# Patient Record
Sex: MALE | Race: WHITE | NOT HISPANIC OR LATINO | Employment: FULL TIME | RURAL
[De-identification: names, ages, dates, MRNs, and addresses within clinical notes are randomized per-mention and may not be internally consistent; named-entity substitution may affect disease eponyms.]

---

## 2021-09-17 NOTE — PROGRESS NOTES
Subjective   Silverio Angeles is a 56 y.o. male.     Chief Complaint   Patient presents with   • Establish Care   • Blood Sugar Problem   • Nicotine Dependence   • Cyst   • Foot Injury       Silverio reports that he was seen at [facility] due to foot pain and infection approx 5 weeks ago. He was administered a IV antibiotic and sent home on cephalexin. He then followed up with Dr Reyes (podiatry) and was prescribed amoxicillin in addition to the cephalexin. He reports that he finished course of antibiotics and is scheduled for a followup in 2 weeks with Dr Reyes.     He also reports that hospital was concerned of elevated blood sugar reading of over 200 at time of visit     Blood Sugar Problem  This is a new problem. The problem occurs constantly. Associated symptoms include numbness (in feet, swelling in calves and ankle with redness on bottom of feet ), a rash and urinary symptoms. Pertinent negatives include no abdominal pain, anorexia, arthralgias, change in bowel habit, chest pain, chills, congestion, coughing, diaphoresis, fatigue, fever, headaches, joint swelling, myalgias, nausea, neck pain, sore throat, swollen glands, vertigo, visual change, vomiting or weakness.   Nicotine Dependence  Presents for initial visit. Symptoms include cravings and irritability. Symptoms are negative for fatigue, headache and sore throat. Preferred tobacco types include cigarettes. Preferred cigarette types include filtered. Preferred strength is light. Preferred cigarettes are non-menthol. Preferred brands include [place]. His urge triggers include company of smokers, drinking coffee and driving. Risk factors do not include contact with substance, decrease in perceived risk, disruptive behavior, drinking alcohol, meal time, perceived media message about smoking or stress.His first smoke is before 6 AM. He smokes 3 packs of cigarettes per day. He started smoking when he was 15-17 years old. Past treatments include buproprion. The  treatment provided significant relief. Compliance with prior treatments has been good. Silverio is ready to quit. Silverio has tried to quit 1 time. There is no history of alcohol abuse and drug use.   Cyst  This is a new problem. The current episode started more than 1 year ago. The problem occurs constantly. The problem has been unchanged. Associated symptoms include numbness (in feet, swelling in calves and ankle with redness on bottom of feet ), a rash and urinary symptoms. Pertinent negatives include no abdominal pain, anorexia, arthralgias, change in bowel habit, chest pain, chills, congestion, coughing, diaphoresis, fatigue, fever, headaches, joint swelling, myalgias, nausea, neck pain, sore throat, swollen glands, vertigo, visual change, vomiting or weakness. Associated symptoms comments: Itching, oozing. Nothing aggravates the symptoms. He has tried nothing for the symptoms.   Foot Injury   The incident occurred more than 1 week ago. There was no injury mechanism. The pain is present in the left foot. The quality of the pain is described as aching. The pain is at a severity of 2/10. The pain is mild. The pain has been fluctuating since onset. Associated symptoms include numbness (in feet, swelling in calves and ankle with redness on bottom of feet ) and tingling. Pertinent negatives include no inability to bear weight, loss of motion, loss of sensation or muscle weakness. He reports no foreign bodies present. Nothing aggravates the symptoms. He has tried acetaminophen and NSAIDs (antibiotics,) for the symptoms. The treatment provided significant relief.            I personally reviewed and updated the patient's allergies, medications, problem list, and past medical, surgical, social, and family history. I have reviewed and confirmed the accuracy of the History of Present Illness and Review of Symptoms as documented by the MA/LPN/RN. Cuco Garcia MD    Family History   Problem Relation Age of Onset   • Cancer  Mother    • Diabetes Father    • Diabetes Sister    • Cancer Sister        Social History     Tobacco Use   • Smoking status: Current Every Day Smoker     Packs/day: 4.00     Years: 40.00     Pack years: 160.00     Types: Cigarettes     Start date: 1981   • Smokeless tobacco: Never Used   Vaping Use   • Vaping Use: Never used   Substance Use Topics   • Alcohol use: Yes     Comment: rarely   • Drug use: Not Currently       Past Surgical History:   Procedure Laterality Date   • HAND SURGERY Left 1981       Patient Active Problem List   Diagnosis   • Foot injury, left, sequela   • Elevated blood sugar   • Encounter for tobacco use cessation counseling   • Current tobacco use   • Skin lesion   • Class 2 severe obesity due to excess calories with serious comorbidity and body mass index (BMI) of 36.0 to 36.9 in adult (CMS/[facility])         Current Outpatient Medications:   •  buPROPion XL (WELLBUTRIN XL) 150 MG 24 hr tablet, Take 1 tablet by mouth Daily., Disp: 30 tablet, Rfl: 2  •  lisinopril (PRINIVIL,ZESTRIL) 20 MG tablet, Take 1 tablet by mouth Daily., Disp: 30 tablet, Rfl: 2  •  metFORMIN (GLUCOPHAGE) 500 MG tablet, Take 1 tablet by mouth 2 (Two) Times a Day With Meals., Disp: 60 tablet, Rfl: 2         Review of Systems   Constitutional: Positive for irritability. Negative for chills, diaphoresis, fatigue and fever.   HENT: Negative for congestion, sore throat and swollen glands.    Eyes: Negative for visual disturbance.   Respiratory: Negative for cough and shortness of breath.    Cardiovascular: Negative for chest pain and palpitations.   Gastrointestinal: Negative for abdominal pain, anorexia, change in bowel habit, nausea and vomiting.   Endocrine: Negative for polydipsia and polyphagia.   Musculoskeletal: Negative for arthralgias, joint swelling, myalgias, neck pain and neck stiffness.   Skin: Positive for rash. Negative for color change and pallor.   Neurological: Positive for tingling and numbness (in feet,  "swelling in calves and ankle with redness on bottom of feet ). Negative for vertigo, seizures, syncope and weakness.   Hematological: Negative for adenopathy.   Psychiatric/Behavioral: Negative for hallucinations, suicidal ideas and stress.       I have reviewed and confirmed the accuracy of the ROS as documented by the MA/LPN/RN Cuco Garcia MD      Objective   BP (!) 162/102   Pulse 104   Temp 96.8 °F (36 °C)   Resp 18   Ht 190.5 cm (75\")   Wt 133 kg (293 lb)   SpO2 98%   BMI 36.62 kg/m²   BP Readings from Last 3 Encounters:   09/20/21 (!) 162/102     Wt Readings from Last 3 Encounters:   09/20/21 133 kg (293 lb)     Physical Exam  Constitutional:       Appearance: Normal appearance. He is well-developed. He is not diaphoretic.   Eyes:      General: Lids are normal.      Extraocular Movements:      Right eye: No nystagmus.      Left eye: No nystagmus.      Conjunctiva/sclera: Conjunctivae normal.      Right eye: Right conjunctiva is not injected. No hemorrhage.     Left eye: Left conjunctiva is not injected. No hemorrhage.     Pupils: Pupils are equal, round, and reactive to light.      Funduscopic exam:     Right eye: No hemorrhage, exudate, AV nicking, arteriolar narrowing or papilledema.         Left eye: No hemorrhage, exudate, AV nicking, arteriolar narrowing or papilledema.   Cardiovascular:      Rate and Rhythm: Normal rate and regular rhythm.      Pulses: Normal pulses.      Heart sounds: Normal heart sounds, S1 normal and S2 normal. No murmur heard.   No friction rub. No gallop.    Pulmonary:      Effort: Pulmonary effort is normal. No accessory muscle usage.      Breath sounds: Normal breath sounds. No stridor. No decreased breath sounds, wheezing, rhonchi or rales.   Abdominal:      General: Bowel sounds are normal. There is no distension.      Palpations: Abdomen is soft. Abdomen is not rigid. There is no mass or pulsatile mass.      Tenderness: There is no abdominal tenderness. There is no " guarding or rebound. Negative signs include Youngblood's sign.      Hernia: No hernia is present.   Feet:      Right foot:      Protective Sensation: 10 sites tested. 10 sites sensed.      Skin integrity: Dry skin present.      Toenail Condition: Right toenails are normal.      Left foot:      Protective Sensation: 10 sites tested. 10 sites sensed.      Skin integrity: Ulcer, skin breakdown, callus and dry skin present.      Toenail Condition: Left toenails are normal.   Skin:     General: Skin is warm and dry.      Coloration: Skin is not pale.   Neurological:      Mental Status: He is alert and oriented to person, place, and time.      Coordination: Coordination normal.      Gait: Gait normal.         Data / Lab Results:    Hemoglobin A1C   Date Value Ref Range Status   09/20/2021 12.8 % Final        No results found for: LDL, LDLDIRECT  No results found for: CHOL  No results found for: TRIG  No results found for: HDL  No results found for: PSA  No results found for: WBC, HGB, HCT, MCV, PLT  No results found for: TSH, E9XXLFA, A8VXZMH   No results found for: GLUCOSE, BUN, CREATININE, EGFRIFNONA, EGFRIFAFRI, BCR, K, CO2, CALCIUM, PROTENTOTREF, ALBUMIN, LABIL2, BILIRUBIN, AST, ALT  No results found for: ADRIANA, RF, SEDRATE   No results found for: CRP   No results found for: IRON, TIBC, FERRITIN   No results found for: [id number]       Assessment/Plan      Medications        Problem List         LOS    Cellulitis foot.  Clinically improved today, completing course antibiotics, followed by podiatry Dr. Reyes.  Type 2 diabetes.  New onset A1c 12.5.  Start Metformin.  Monitor blood sugars.  Follow-up 1 month.  Discussed diet, exercise, lifestyle modification.  Coated baby aspirin daily.  Hypertension.  Longstanding.  Start lisinopril.  Follow-up recheck.  Tobacco use.  Smokes 4 packs/day since the age of 20.  Encourage cessation.  Has done well on Wellbutrin, restart.  Follow-up recheck.  New patient today.  Follow-up visit  for comprehensive physical exam and screening tests scheduled.        Diagnoses and all orders for this visit:    1. Encounter to establish care (Primary)    2. New onset type 2 diabetes mellitus (CMS/[facility])  -     POC Glycosylated Hemoglobin (Hb A1C)  -     Discontinue: metFORMIN (GLUCOPHAGE) 500 MG tablet; Take 1 tablet by mouth 2 (Two) Times a Day With Meals.  Dispense: 60 tablet; Refill: 2  -     metFORMIN (GLUCOPHAGE) 500 MG tablet; Take 1 tablet by mouth 2 (Two) Times a Day With Meals.  Dispense: 60 tablet; Refill: 2    3. Type 2 diabetes mellitus with hyperglycemia, without long-term current use of insulin (CMS/[facility])  -     POC Glycosylated Hemoglobin (Hb A1C)  -     Cancel: Comprehensive Metabolic Panel  -     Discontinue: metFORMIN (GLUCOPHAGE) 500 MG tablet; Take 1 tablet by mouth 2 (Two) Times a Day With Meals.  Dispense: 60 tablet; Refill: 2  -     Comprehensive Metabolic Panel  -     metFORMIN (GLUCOPHAGE) 500 MG tablet; Take 1 tablet by mouth 2 (Two) Times a Day With Meals.  Dispense: 60 tablet; Refill: 2    4. Foot injury, left, sequela    5. Elevated blood sugar  -     POC Glycosylated Hemoglobin (Hb A1C)    6. Encounter for tobacco use cessation counseling    7. Current tobacco use  -     Discontinue: buPROPion XL (WELLBUTRIN XL) 150 MG 24 hr tablet; Take 1 tablet by mouth Daily.  Dispense: 30 tablet; Refill: 2  -     buPROPion XL (WELLBUTRIN XL) 150 MG 24 hr tablet; Take 1 tablet by mouth Daily.  Dispense: 30 tablet; Refill: 2    8. Skin lesion    9. Class 2 severe obesity due to excess calories with serious comorbidity and body mass index (BMI) of 36.0 to 36.9 in adult (CMS/[facility])  -     Discontinue: buPROPion XL (WELLBUTRIN XL) 150 MG 24 hr tablet; Take 1 tablet by mouth Daily.  Dispense: 30 tablet; Refill: 2  -     buPROPion XL (WELLBUTRIN XL) 150 MG 24 hr tablet; Take 1 tablet by mouth Daily.  Dispense: 30 tablet; Refill: 2    10. Screening for hyperlipidemia  -     Cancel: Lipid Panel With / Chol /  HDL Ratio  -     Lipid Panel With / Chol / HDL Ratio    11. Hypertension, essential  -     Cancel: CBC & Differential  -     Cancel: Comprehensive Metabolic Panel  -     Cancel: TSH  -     CBC & Differential  -     Comprehensive Metabolic Panel  -     TSH  -     lisinopril (PRINIVIL,ZESTRIL) 20 MG tablet; Take 1 tablet by mouth Daily.  Dispense: 30 tablet; Refill: 2    12. Screening for malignant neoplasm of prostate  -     Cancel: PSA Screen  -     PSA Screen    13. Acute reaction to stress  -     Discontinue: buPROPion XL (WELLBUTRIN XL) 150 MG 24 hr tablet; Take 1 tablet by mouth Daily.  Dispense: 30 tablet; Refill: 2  -     buPROPion XL (WELLBUTRIN XL) 150 MG 24 hr tablet; Take 1 tablet by mouth Daily.  Dispense: 30 tablet; Refill: 2    Other orders  -     Discontinue: lisinopril (PRINIVIL,ZESTRIL) 20 MG tablet; Take 1 tablet by mouth Daily.  Dispense: 30 tablet; Refill: 2              Expected course, medications, and adverse effects discussed.  Call or return if worsening or persistent symptoms.  I wore protective equipment throughout this patient encounter including a mask, gloves, and eye protection.  Hand hygiene was performed before donning protective equipment and after removal when leaving the room. The complete contents of the Assessment and Plan and Data/Lab Results as documented above have been reviewed and addressed by myself with the patient today as part of an ongoing evaluation / treatment plan.  If some of the documentation has been copied from a previous note and is unchanged it indicates that this problem / plan has been assessed today but is stable from a previous visit and no changes have been recommended.

## 2021-09-20 ENCOUNTER — OFFICE VISIT (OUTPATIENT)
Dept: FAMILY MEDICINE CLINIC | Facility: CLINIC | Age: 56
End: 2021-09-20

## 2021-09-20 VITALS
TEMPERATURE: 96.8 F | OXYGEN SATURATION: 98 % | HEIGHT: 75 IN | HEART RATE: 104 BPM | DIASTOLIC BLOOD PRESSURE: 102 MMHG | SYSTOLIC BLOOD PRESSURE: 162 MMHG | RESPIRATION RATE: 18 BRPM | WEIGHT: 293 LBS | BODY MASS INDEX: 36.43 KG/M2

## 2021-09-20 DIAGNOSIS — F43.0 ACUTE REACTION TO STRESS: ICD-10-CM

## 2021-09-20 DIAGNOSIS — I10 HYPERTENSION, ESSENTIAL: ICD-10-CM

## 2021-09-20 DIAGNOSIS — Z72.0 CURRENT TOBACCO USE: ICD-10-CM

## 2021-09-20 DIAGNOSIS — S99.922S FOOT INJURY, LEFT, SEQUELA: ICD-10-CM

## 2021-09-20 DIAGNOSIS — E11.65 TYPE 2 DIABETES MELLITUS WITH HYPERGLYCEMIA, WITHOUT LONG-TERM CURRENT USE OF INSULIN (HCC): ICD-10-CM

## 2021-09-20 DIAGNOSIS — R73.9 ELEVATED BLOOD SUGAR: ICD-10-CM

## 2021-09-20 DIAGNOSIS — Z76.89 ENCOUNTER TO ESTABLISH CARE: Primary | ICD-10-CM

## 2021-09-20 DIAGNOSIS — E66.01 CLASS 2 SEVERE OBESITY DUE TO EXCESS CALORIES WITH SERIOUS COMORBIDITY AND BODY MASS INDEX (BMI) OF 36.0 TO 36.9 IN ADULT (HCC): ICD-10-CM

## 2021-09-20 DIAGNOSIS — Z13.220 SCREENING FOR HYPERLIPIDEMIA: ICD-10-CM

## 2021-09-20 DIAGNOSIS — E11.9 NEW ONSET TYPE 2 DIABETES MELLITUS (HCC): ICD-10-CM

## 2021-09-20 DIAGNOSIS — L98.9 SKIN LESION: ICD-10-CM

## 2021-09-20 DIAGNOSIS — Z12.5 SCREENING FOR MALIGNANT NEOPLASM OF PROSTATE: ICD-10-CM

## 2021-09-20 DIAGNOSIS — Z71.6 ENCOUNTER FOR TOBACCO USE CESSATION COUNSELING: ICD-10-CM

## 2021-09-20 LAB — HBA1C MFR BLD: 12.8 %

## 2021-09-20 PROCEDURE — 99204 OFFICE O/P NEW MOD 45 MIN: CPT | Performed by: FAMILY MEDICINE

## 2021-09-20 RX ORDER — BUPROPION HYDROCHLORIDE 150 MG/1
150 TABLET ORAL DAILY
Qty: 30 TABLET | Refills: 2 | Status: SHIPPED | OUTPATIENT
Start: 2021-09-20 | End: 2021-09-20

## 2021-09-20 RX ORDER — LISINOPRIL 20 MG/1
20 TABLET ORAL DAILY
Qty: 30 TABLET | Refills: 2 | Status: SHIPPED | OUTPATIENT
Start: 2021-09-20 | End: 2021-09-20

## 2021-09-20 RX ORDER — BUPROPION HYDROCHLORIDE 150 MG/1
150 TABLET ORAL DAILY
Qty: 30 TABLET | Refills: 2 | Status: SHIPPED | OUTPATIENT
Start: 2021-09-20 | End: 2021-12-16 | Stop reason: SDUPTHER

## 2021-09-20 RX ORDER — LISINOPRIL 20 MG/1
20 TABLET ORAL DAILY
Qty: 30 TABLET | Refills: 2 | Status: SHIPPED | OUTPATIENT
Start: 2021-09-20 | End: 2021-12-16 | Stop reason: SDUPTHER

## 2021-09-21 ENCOUNTER — TELEPHONE (OUTPATIENT)
Dept: FAMILY MEDICINE CLINIC | Facility: CLINIC | Age: 56
End: 2021-09-21

## 2021-09-21 LAB
ALBUMIN SERPL-MCNC: 4.5 G/DL (ref 3.8–4.9)
ALBUMIN/GLOB SERPL: 1.7 {RATIO} (ref 1.2–2.2)
ALP SERPL-CCNC: 95 IU/L (ref 44–121)
ALT SERPL-CCNC: 24 IU/L (ref 0–44)
AST SERPL-CCNC: 16 IU/L (ref 0–40)
BASOPHILS # BLD AUTO: 0 X10E3/UL (ref 0–0.2)
BASOPHILS NFR BLD AUTO: 1 %
BILIRUB SERPL-MCNC: 0.8 MG/DL (ref 0–1.2)
BUN SERPL-MCNC: 13 MG/DL (ref 6–24)
BUN/CREAT SERPL: 17 (ref 9–20)
CALCIUM SERPL-MCNC: 9.7 MG/DL (ref 8.7–10.2)
CHLORIDE SERPL-SCNC: 94 MMOL/L (ref 96–106)
CHOLEST SERPL-MCNC: 124 MG/DL (ref 100–199)
CHOLEST/HDLC SERPL: 2.9 RATIO (ref 0–5)
CO2 SERPL-SCNC: 24 MMOL/L (ref 20–29)
CREAT SERPL-MCNC: 0.75 MG/DL (ref 0.76–1.27)
EOSINOPHIL # BLD AUTO: 0 X10E3/UL (ref 0–0.4)
EOSINOPHIL NFR BLD AUTO: 0 %
ERYTHROCYTE [DISTWIDTH] IN BLOOD BY AUTOMATED COUNT: 13 % (ref 11.6–15.4)
GLOBULIN SER CALC-MCNC: 2.7 G/DL (ref 1.5–4.5)
GLUCOSE SERPL-MCNC: ABNORMAL MG/DL
HCT VFR BLD AUTO: 50.9 % (ref 37.5–51)
HDLC SERPL-MCNC: 43 MG/DL
HGB BLD-MCNC: 17.2 G/DL (ref 13–17.7)
IMM GRANULOCYTES # BLD AUTO: 0 X10E3/UL (ref 0–0.1)
IMM GRANULOCYTES NFR BLD AUTO: 0 %
LDLC SERPL CALC-MCNC: 59 MG/DL (ref 0–99)
LYMPHOCYTES # BLD AUTO: 2.6 X10E3/UL (ref 0.7–3.1)
LYMPHOCYTES NFR BLD AUTO: 30 %
MCH RBC QN AUTO: 31.4 PG (ref 26.6–33)
MCHC RBC AUTO-ENTMCNC: 33.8 G/DL (ref 31.5–35.7)
MCV RBC AUTO: 93 FL (ref 79–97)
MONOCYTES # BLD AUTO: 0.5 X10E3/UL (ref 0.1–0.9)
MONOCYTES NFR BLD AUTO: 6 %
NEUTROPHILS # BLD AUTO: 5.5 X10E3/UL (ref 1.4–7)
NEUTROPHILS NFR BLD AUTO: 63 %
PLATELET # BLD AUTO: 323 X10E3/UL (ref 150–450)
POTASSIUM SERPL-SCNC: ABNORMAL MMOL/L
PROT SERPL-MCNC: 7.2 G/DL (ref 6–8.5)
PSA SERPL-MCNC: 2 NG/ML (ref 0–4)
RBC # BLD AUTO: 5.48 X10E6/UL (ref 4.14–5.8)
SODIUM SERPL-SCNC: 135 MMOL/L (ref 134–144)
TRIGL SERPL-MCNC: 124 MG/DL (ref 0–149)
TSH SERPL DL<=0.005 MIU/L-ACNC: 1.38 UIU/ML (ref 0.45–4.5)
VLDLC SERPL CALC-MCNC: 22 MG/DL (ref 5–40)
WBC # BLD AUTO: 8.7 X10E3/UL (ref 3.4–10.8)

## 2021-09-21 NOTE — TELEPHONE ENCOUNTER
PLEASE CANCEL THE PRESCRIPTIONS THAT WERE SENT TO [facility] IN [place], SO PATIENT CAN HAVE THEM FILLED AT [facility] IN [place], [place].  PLEASE CONTACT PATIENT ONCE THIS HAS BEEN TAKEN CARE -[phone].  metFORMIN (GLUCOPHAGE) 500 MG tablet  lisinopril (PRINIVIL,ZESTRIL) 20 MG tablet  buPROPion XL (WELLBUTRIN XL) 150 MG 24 hr tablet    PATIENT ALSO NEEDS TO RESCHEDULE HIS PHYSICAL APPOINTMENT AND DR. CONWAY WANTED TO SEE HIM WITH IN A MONTH FROM 9/20 FOR MEDICATION FOLLOW UP.  PATIENT IS WORKING OUT OF TOWN AND NEEDS [facility] FIRST THING OR [facility] LAST APPOINTMENT.  PLEASE HELP RESCHEDULE.

## 2021-09-21 NOTE — TELEPHONE ENCOUNTER
Spoke to [facility]. He said they closed early yesterday and looks like corporate took care of this. All meds are deleted from [facility]. Called and let him know.

## 2021-10-15 NOTE — PROGRESS NOTES
Subjective   Silverio Angeles is a 56 y.o. male.     Chief Complaint   Patient presents with   • Annual Exam   • Diabetes   • Hypertension       The patient is here: to discuss health maintenance and disease prevention to follow up on multiple medical problems.  Last comprehensive physical was on unknown.  Overall has: moderate activity with work/home activities, exercises 2 - 3 times per week, good appetite and is sleeping poorly. Nutrition: eating a variety of foods. Last tetanus shot was >10 years ago. Patient's last PSA was: 2.0 on 9/20/2021    Diabetes  He presents for his follow-up diabetic visit. He has type 2 diabetes mellitus. Pertinent negatives for hypoglycemia include no pallor or seizures. Pertinent negatives for diabetes include no chest pain, no polydipsia and no polyphagia. There are no hypoglycemic complications. Risk factors for coronary artery disease include male sex, obesity and hypertension. Current diabetic treatment includes oral agent (monotherapy). His breakfast blood glucose range is generally >200 mg/dl. His dinner blood glucose range is generally 140-180 mg/dl. His overall blood glucose range is 180-200 mg/dl. (Glucose was 270 at today's visit. Patient has only had black coffee today  ) He does not see a podiatrist.Eye exam is not current.   Hypertension  This is a chronic problem. The current episode started more than 1 year ago. Pertinent negatives include no chest pain, palpitations or shortness of breath. Risk factors for coronary artery disease include obesity, male gender, diabetes mellitus and smoking/tobacco exposure. Current antihypertension treatment includes ACE inhibitors.   Abdominal Pain  This is a new problem. Episode onset: 4 days ago. The problem occurs constantly. The pain is at a severity of 4/10. The pain is mild. Quality: Air  Pertinent negatives include no nausea. Relieved by: Laying down.        Recent Hospitalizations:  No hospitalization(s) within the last  year..  ccc      I personally reviewed and updated the patient's allergies, medications, problem list, and past medical, surgical, social, and family history. I have reviewed and confirmed the accuracy of the HPI and ROS as documented by the MA/LPN/RN Cuco Garcia MD    Family History   Problem Relation Age of Onset   • Cancer Mother    • Diabetes Father    • Diabetes Sister    • Cancer Sister        Social History     Tobacco Use   • Smoking status: Current Every Day Smoker     Packs/day: 4.00     Years: 40.00     Pack years: 160.00     Types: Cigarettes     Start date: 1981   • Smokeless tobacco: Never Used   Vaping Use   • Vaping Use: Never used   Substance Use Topics   • Alcohol use: Yes     Alcohol/week: 0.0 standard drinks     Comment: rarely   • Drug use: Not Currently       Past Surgical History:   Procedure Laterality Date   • HAND SURGERY Left 1981       Patient Active Problem List   Diagnosis   • Foot injury, left, sequela   • Encounter for tobacco use cessation counseling   • Current tobacco use   • Skin lesion   • Annual visit for general adult medical examination with abnormal findings   • Type 2 diabetes mellitus with hyperglycemia, without long-term current use of insulin (HCC)   • Elevated blood pressure reading   • Class 2 severe obesity due to excess calories with serious comorbidity and body mass index (BMI) of 35.0 to 35.9 in adult (HCC)   • Essential hypertension         Current Outpatient Medications:   •  buPROPion XL (WELLBUTRIN XL) 150 MG 24 hr tablet, Take 1 tablet by mouth Daily., Disp: 30 tablet, Rfl: 2  •  lisinopril (PRINIVIL,ZESTRIL) 20 MG tablet, Take 1 tablet by mouth Daily., Disp: 30 tablet, Rfl: 2  •  metFORMIN (GLUCOPHAGE) 500 MG tablet, Take 1 tablet by mouth 2 (Two) Times a Day With Meals., Disp: 60 tablet, Rfl: 2  •  glipizide (glipiZIDE XL) 5 MG ER tablet, Take 1 tablet by mouth Daily., Disp: 30 tablet, Rfl: 5  •  Glucose Blood (Blood Glucose Test) strip, 1 strip by In  "Vitro route 3 (Three) Times a Day., Disp: 90 each, Rfl: 5    Review of Systems   Constitutional: Negative for chills and diaphoresis.   Eyes: Negative for visual disturbance.   Respiratory: Negative for shortness of breath.    Cardiovascular: Negative for chest pain and palpitations.   Gastrointestinal: Negative for abdominal pain and nausea.   Endocrine: Negative for polydipsia and polyphagia.   Musculoskeletal: Negative for neck stiffness.   Skin: Negative for color change and pallor.   Neurological: Negative for seizures and syncope.   Hematological: Negative for adenopathy.       I have reviewed and confirmed the accuracy of the ROS as documented by the MA/LPN/RN Cuco Garcia MD      Objective   /84   Pulse 109   Temp 98 °F (36.7 °C)   Resp 18   Ht 190.5 cm (75\")   Wt 129 kg (283 lb 12.8 oz)   SpO2 99%   BMI 35.47 kg/m²   BP Readings from Last 3 Encounters:   10/27/21 132/84   09/20/21 (!) 162/102     Wt Readings from Last 3 Encounters:   10/27/21 129 kg (283 lb 12.8 oz)   09/20/21 133 kg (293 lb)     Physical Exam  Constitutional:       Appearance: Normal appearance. He is well-developed. He is not diaphoretic.   Eyes:      General: Lids are normal.      Extraocular Movements:      Right eye: No nystagmus.      Left eye: No nystagmus.      Conjunctiva/sclera: Conjunctivae normal.      Right eye: Right conjunctiva is not injected. No hemorrhage.     Left eye: Left conjunctiva is not injected. No hemorrhage.     Pupils: Pupils are equal, round, and reactive to light.      Funduscopic exam:     Right eye: No hemorrhage, exudate, AV nicking, arteriolar narrowing or papilledema.         Left eye: No hemorrhage, exudate, AV nicking, arteriolar narrowing or papilledema.   Cardiovascular:      Rate and Rhythm: Normal rate and regular rhythm.      Pulses: Normal pulses.      Heart sounds: Normal heart sounds, S1 normal and S2 normal. No murmur heard.  No friction rub. No gallop.    Pulmonary:      Effort: " Pulmonary effort is normal. No accessory muscle usage.      Breath sounds: Normal breath sounds. No stridor. No decreased breath sounds, wheezing, rhonchi or rales.   Abdominal:      General: Bowel sounds are normal. There is no distension.      Palpations: Abdomen is soft. Abdomen is not rigid. There is no mass or pulsatile mass.      Tenderness: There is no abdominal tenderness. There is no guarding or rebound. Negative signs include Youngblood's sign.      Hernia: No hernia is present.   Skin:     General: Skin is warm and dry.      Coloration: Skin is not pale.   Neurological:      Mental Status: He is alert and oriented to person, place, and time.      Coordination: Coordination normal.      Gait: Gait normal.         Data / Lab Results:    Hemoglobin A1C   Date Value Ref Range Status   10/27/2021 10.0 % Final   09/20/2021 12.8 % Final     Lab Results   Component Value Date    GLU CANCELED 09/20/2021     Lab Results   Component Value Date    LDL 59 09/20/2021     No results found for: CHOL  Lab Results   Component Value Date    TRIG 124 09/20/2021     Lab Results   Component Value Date    HDL 43 09/20/2021     Lab Results   Component Value Date    PSA 2.0 09/20/2021     Lab Results   Component Value Date    WBC 8.7 09/20/2021    HGB 17.2 09/20/2021    HCT 50.9 09/20/2021    MCV 93 09/20/2021     09/20/2021     Lab Results   Component Value Date    TSH 1.380 09/20/2021      Lab Results   Component Value Date    BUN 13 09/20/2021    CREATININE 0.75 (L) 09/20/2021    EGFRIFNONA 104 09/20/2021    EGFRIFAFRI 120 09/20/2021    BCR 17 09/20/2021    K CANCELED 09/20/2021    CO2 24 09/20/2021    CALCIUM 9.7 09/20/2021    PROTENTOTREF 7.2 09/20/2021    ALBUMIN 4.5 09/20/2021    LABIL2 1.7 09/20/2021    AST 16 09/20/2021    ALT 24 09/20/2021     No results found for: ADRIANA, RF, SEDRATE   No results found for: CRP   No results found for: IRON, TIBC, FERRITIN   No results found for: [id number]     Age-appropriate Screening  Schedule:  Refer to the list below for future screening recommendations based on patient's age, sex and/or medical conditions. Orders for these recommended tests are listed in the plan section. The patient has been provided with a written plan.    Health Maintenance   Topic Date Due   • TDAP/TD VACCINES (1 - Tdap) Never done   • ZOSTER VACCINE (1 of 2) Never done   • INFLUENZA VACCINE  Never done   • DIABETIC FOOT EXAM  Never done   • DIABETIC EYE EXAM  Never done   • HEMOGLOBIN A1C  04/27/2022   • URINE MICROALBUMIN  10/27/2022           Assessment/Plan      Medications        Problem List         LOS    Physical.  Doing well, vaccines current.  Discussed coated baby aspirin daily.  Discussed health maintenance, screening test, lifestyle modification.  EKG benign today.  Cellulitis foot.  Clinically improved today, completing course antibiotics, followed by podiatry Dr. Reyes.  Type 2 diabetes.  New onset, improved today A1c to 10.    Tolerating Metformin 500 mg twice daily, add glipizide XL..    Home blood sugars levels reviewed, improving.  Follow-up 3 month.  Discussed diet, exercise, lifestyle modification.  Coated baby aspirin daily.  Treadmill echocardiogram, carotid Dopplers planned when blood sugar is better controlled.  Hypertension.  Longstanding.    Improved today on lisinopril.  Follow-up recheck.  Tobacco use.    Improved today, has cut back to 2 packs/day.  Smokes 4 packs/day since the age of 20.  Encourage cessation.  Has done well on Wellbutrin, restart.  Follow-up recheck.  Plan low-dose CT scan lung cancer screening next year.  Colon screening.  Recommend colonoscopy/Cologuard he declines.  Prostate cancer screening.  PSA benign.        Diagnoses and all orders for this visit:    1. Annual visit for general adult medical examination with abnormal findings (Primary)    2. Type 2 diabetes mellitus with hyperglycemia, without long-term current use of insulin (HCC)  -     POC Glycosylated Hemoglobin  (Hb A1C)  -     POCT Glucose  -     POCT urinalysis dipstick, manual  -     POCT microalbumin  -     Glucose Blood (Blood Glucose Test) strip; 1 strip by In Vitro route 3 (Three) Times a Day.  Dispense: 90 each; Refill: 5  -     glipizide (glipiZIDE XL) 5 MG ER tablet; Take 1 tablet by mouth Daily.  Dispense: 30 tablet; Refill: 5    3. Elevated blood pressure reading  -     ECG 12 Lead    4. Current tobacco use    5. Class 2 severe obesity due to excess calories with serious comorbidity and body mass index (BMI) of 35.0 to 35.9 in adult (HCC)    6. Chest pain, unspecified type  -     ECG 12 Lead    7. Essential hypertension              Expected course, medications, and adverse effects discussed.  Call or return if worsening or persistent symptoms.  I wore protective equipment throughout this patient encounter including a mask, gloves, and eye protection.  Hand hygiene was performed before donning protective equipment and after removal when leaving the room. The complete contents of the Assessment and Plan and Data / Lab Results as documented above have been reviewed and addressed by myself with the patient today as part of an ongoing evaluation / treatment plan.  If some of the documentation has been copied from a previous note and is unchanged it indicates that this problem / plan has been assessed today but is stable from a previous visit and no changes have been recommended.

## 2021-10-27 ENCOUNTER — OFFICE VISIT (OUTPATIENT)
Dept: FAMILY MEDICINE CLINIC | Facility: CLINIC | Age: 56
End: 2021-10-27

## 2021-10-27 VITALS
DIASTOLIC BLOOD PRESSURE: 84 MMHG | WEIGHT: 283.8 LBS | HEART RATE: 109 BPM | SYSTOLIC BLOOD PRESSURE: 132 MMHG | TEMPERATURE: 98 F | OXYGEN SATURATION: 99 % | BODY MASS INDEX: 35.29 KG/M2 | RESPIRATION RATE: 18 BRPM | HEIGHT: 75 IN

## 2021-10-27 DIAGNOSIS — Z72.0 CURRENT TOBACCO USE: ICD-10-CM

## 2021-10-27 DIAGNOSIS — I10 ESSENTIAL HYPERTENSION: ICD-10-CM

## 2021-10-27 DIAGNOSIS — R07.9 CHEST PAIN, UNSPECIFIED TYPE: ICD-10-CM

## 2021-10-27 DIAGNOSIS — E11.65 TYPE 2 DIABETES MELLITUS WITH HYPERGLYCEMIA, WITHOUT LONG-TERM CURRENT USE OF INSULIN (HCC): ICD-10-CM

## 2021-10-27 DIAGNOSIS — E66.01 CLASS 2 SEVERE OBESITY DUE TO EXCESS CALORIES WITH SERIOUS COMORBIDITY AND BODY MASS INDEX (BMI) OF 35.0 TO 35.9 IN ADULT (HCC): ICD-10-CM

## 2021-10-27 DIAGNOSIS — R03.0 ELEVATED BLOOD PRESSURE READING: ICD-10-CM

## 2021-10-27 DIAGNOSIS — Z00.01 ANNUAL VISIT FOR GENERAL ADULT MEDICAL EXAMINATION WITH ABNORMAL FINDINGS: Primary | ICD-10-CM

## 2021-10-27 PROBLEM — R73.9 ELEVATED BLOOD SUGAR: Status: RESOLVED | Noted: 2021-09-20 | Resolved: 2021-10-27

## 2021-10-27 LAB
BILIRUB BLD-MCNC: NEGATIVE MG/DL
CLARITY, POC: CLEAR
COLOR UR: ABNORMAL
EXPIRATION DATE: NORMAL
GLUCOSE BLDC GLUCOMTR-MCNC: 265 MG/DL (ref 70–130)
GLUCOSE UR STRIP-MCNC: NEGATIVE MG/DL
HBA1C MFR BLD: 10 %
KETONES UR QL: NEGATIVE
LEUKOCYTE EST, POC: NEGATIVE
Lab: NORMAL
NITRITE UR-MCNC: NEGATIVE MG/ML
PH UR: 5 [PH] (ref 5–8)
POC MICROALBUMIN URINE: 0.5
PROT UR STRIP-MCNC: ABNORMAL MG/DL
RBC # UR STRIP: NEGATIVE /UL
SP GR UR: 1 (ref 1–1.03)
UROBILINOGEN UR QL: NORMAL

## 2021-10-27 PROCEDURE — 81002 URINALYSIS NONAUTO W/O SCOPE: CPT | Performed by: FAMILY MEDICINE

## 2021-10-27 PROCEDURE — 83036 HEMOGLOBIN GLYCOSYLATED A1C: CPT | Performed by: FAMILY MEDICINE

## 2021-10-27 PROCEDURE — 99396 PREV VISIT EST AGE 40-64: CPT | Performed by: FAMILY MEDICINE

## 2021-10-27 PROCEDURE — 99214 OFFICE O/P EST MOD 30 MIN: CPT | Performed by: FAMILY MEDICINE

## 2021-10-27 PROCEDURE — 93000 ELECTROCARDIOGRAM COMPLETE: CPT | Performed by: FAMILY MEDICINE

## 2021-10-27 PROCEDURE — 82044 UR ALBUMIN SEMIQUANTITATIVE: CPT | Performed by: FAMILY MEDICINE

## 2021-10-27 PROCEDURE — 82962 GLUCOSE BLOOD TEST: CPT | Performed by: FAMILY MEDICINE

## 2021-10-27 RX ORDER — GLIPIZIDE 5 MG/1
5 TABLET, FILM COATED, EXTENDED RELEASE ORAL DAILY
Qty: 30 TABLET | Refills: 5 | Status: SHIPPED | OUTPATIENT
Start: 2021-10-27 | End: 2022-04-15

## 2021-10-27 RX ORDER — GLUCOSAMINE HCL/CHONDROITIN SU 500-400 MG
1 CAPSULE ORAL 3 TIMES DAILY
Qty: 90 EACH | Refills: 5 | Status: SHIPPED | OUTPATIENT
Start: 2021-10-27 | End: 2022-10-10

## 2021-12-16 DIAGNOSIS — Z72.0 CURRENT TOBACCO USE: ICD-10-CM

## 2021-12-16 DIAGNOSIS — E11.9 NEW ONSET TYPE 2 DIABETES MELLITUS (HCC): ICD-10-CM

## 2021-12-16 DIAGNOSIS — F43.0 ACUTE REACTION TO STRESS: ICD-10-CM

## 2021-12-16 DIAGNOSIS — E11.65 TYPE 2 DIABETES MELLITUS WITH HYPERGLYCEMIA, WITHOUT LONG-TERM CURRENT USE OF INSULIN (HCC): ICD-10-CM

## 2021-12-16 DIAGNOSIS — E66.01 CLASS 2 SEVERE OBESITY DUE TO EXCESS CALORIES WITH SERIOUS COMORBIDITY AND BODY MASS INDEX (BMI) OF 36.0 TO 36.9 IN ADULT (HCC): ICD-10-CM

## 2021-12-16 DIAGNOSIS — I10 HYPERTENSION, ESSENTIAL: ICD-10-CM

## 2021-12-17 RX ORDER — LISINOPRIL 20 MG/1
20 TABLET ORAL DAILY
Qty: 30 TABLET | Refills: 2 | Status: SHIPPED | OUTPATIENT
Start: 2021-12-17 | End: 2022-03-20 | Stop reason: SDUPTHER

## 2021-12-17 RX ORDER — BUPROPION HYDROCHLORIDE 150 MG/1
150 TABLET ORAL DAILY
Qty: 30 TABLET | Refills: 2 | Status: SHIPPED | OUTPATIENT
Start: 2021-12-17

## 2022-01-28 NOTE — PROGRESS NOTES
Subjective   Silverio Angeles is a 56 y.o. male.     Chief Complaint   Patient presents with   • Diabetes   • Hypertension       Diabetic Foot Exam Performed and Monofilament Test Performed    Diabetes  He presents for his follow-up diabetic visit. He has type 2 diabetes mellitus. Pertinent negatives for hypoglycemia include no pallor or seizures. Pertinent negatives for diabetes include no chest pain, no polydipsia and no polyphagia. There are no hypoglycemic complications. Risk factors for coronary artery disease include male sex, obesity and hypertension. Current diabetic treatment includes oral agent (monotherapy). His breakfast blood glucose range is generally >200 mg/dl. His dinner blood glucose range is generally 140-180 mg/dl. His overall blood glucose range is 180-200 mg/dl. (Glucose was 160 this morning prior to today's visit.  ) He does not see a podiatrist.Eye exam is not current.   Hypertension  This is a chronic problem. The current episode started more than 1 year ago. Pertinent negatives include no chest pain, palpitations or shortness of breath. Risk factors for coronary artery disease include obesity, male gender, diabetes mellitus and smoking/tobacco exposure. Current antihypertension treatment includes ACE inhibitors.            I personally reviewed and updated the patient's allergies, medications, problem list, and past medical, surgical, social, and family history. I have reviewed and confirmed the accuracy of the History of Present Illness and Review of Symptoms as documented by the MA/MANOLON/RN. Cuco Garcia MD    Family History   Problem Relation Age of Onset   • Cancer Mother    • Diabetes Father    • Diabetes Sister    • Cancer Sister        Social History     Tobacco Use   • Smoking status: Current Every Day Smoker     Packs/day: 4.00     Years: 40.00     Pack years: 160.00     Types: Cigarettes     Start date: 1981   • Smokeless tobacco: Never Used   Vaping Use   • Vaping Use: Never used    Substance Use Topics   • Alcohol use: Yes     Alcohol/week: 0.0 standard drinks     Comment: rarely   • Drug use: Not Currently       Past Surgical History:   Procedure Laterality Date   • HAND SURGERY Left 1981       Patient Active Problem List   Diagnosis   • Foot injury, left, sequela   • Encounter for tobacco use cessation counseling   • Current tobacco use   • Skin lesion   • Annual visit for general adult medical examination with abnormal findings   • Type 2 diabetes mellitus with hyperglycemia, without long-term current use of insulin ([facility])   • Class 2 severe obesity due to excess calories with serious comorbidity and body mass index (BMI) of 35.0 to 35.9 in adult ([facility])   • Essential hypertension   • Gastroesophageal reflux disease without esophagitis         Current Outpatient Medications:   •  Baxdela 450 MG tablet, Take 450 mg by mouth 2 (Two) Times a Day., Disp: , Rfl:   •  buPROPion XL (WELLBUTRIN XL) 150 MG 24 hr tablet, Take 1 tablet by mouth Daily., Disp: 30 tablet, Rfl: 2  •  glipizide (glipiZIDE XL) 5 MG ER tablet, Take 1 tablet by mouth Daily., Disp: 30 tablet, Rfl: 5  •  Glucose Blood (Blood Glucose Test) strip, 1 strip by In Vitro route 3 (Three) Times a Day., Disp: 90 each, Rfl: 5  •  lisinopril (PRINIVIL,ZESTRIL) 20 MG tablet, Take 1 tablet by mouth Daily., Disp: 30 tablet, Rfl: 2  •  metFORMIN (GLUCOPHAGE) 500 MG tablet, Take 1 tablet by mouth 2 (Two) Times a Day With Meals., Disp: 60 tablet, Rfl: 2          Review of Systems   Constitutional: Negative for chills and diaphoresis.   Eyes: Negative for visual disturbance.   Respiratory: Negative for shortness of breath.    Cardiovascular: Negative for chest pain and palpitations.   Gastrointestinal: Negative for abdominal pain and nausea.   Endocrine: Negative for polydipsia and polyphagia.   Musculoskeletal: Negative for neck stiffness.   Skin: Negative for color change and pallor.   Neurological: Negative for seizures and syncope.  "  Hematological: Negative for adenopathy.   Psychiatric/Behavioral: Negative for hallucinations and suicidal ideas.       I have reviewed and confirmed the accuracy of the ROS as documented by the MA/LPN/RN Cuco Garcia MD      Objective   /90   Pulse 90   Temp 97.1 °F (36.2 °C)   Resp 18   Ht 190.5 cm (75\")   Wt 129 kg (284 lb)   SpO2 99%   BMI 35.50 kg/m²   BP Readings from Last 3 Encounters:   01/31/22 150/90   10/27/21 132/84   09/20/21 (!) 162/102     Wt Readings from Last 3 Encounters:   01/31/22 129 kg (284 lb)   10/27/21 129 kg (283 lb 12.8 oz)   09/20/21 133 kg (293 lb)           Data / Lab Results:    Hemoglobin A1C   Date Value Ref Range Status   01/31/2022 7.6 % Final   10/27/2021 10.0 % Final   09/20/2021 12.8 % Final        Lab Results   Component Value Date    LDL 59 09/20/2021     No results found for: CHOL  Lab Results   Component Value Date    TRIG 124 09/20/2021     Lab Results   Component Value Date    HDL 43 09/20/2021     Lab Results   Component Value Date    PSA 2.0 09/20/2021     Lab Results   Component Value Date    WBC 8.7 09/20/2021    HGB 17.2 09/20/2021    HCT 50.9 09/20/2021    MCV 93 09/20/2021     09/20/2021     Lab Results   Component Value Date    TSH 1.380 09/20/2021      Lab Results   Component Value Date    GLUCOSE CANCELED 09/20/2021    BUN 13 09/20/2021    CREATININE 0.75 (L) 09/20/2021    EGFRIFNONA 104 09/20/2021    EGFRIFAFRI 120 09/20/2021    BCR 17 09/20/2021    K CANCELED 09/20/2021    CO2 24 09/20/2021    CALCIUM 9.7 09/20/2021    PROTENTOTREF 7.2 09/20/2021    ALBUMIN 4.5 09/20/2021    LABIL2 1.7 09/20/2021    AST 16 09/20/2021    ALT 24 09/20/2021     No results found for: ADRIANA, RF, SEDRATE   No results found for: CRP   No results found for: IRON, TIBC, FERRITIN   No results found for: [id number]         Physical Exam  Constitutional:       Appearance: Normal appearance. He is well-developed. He is not diaphoretic.   Eyes:      General: Lids are " normal.      Extraocular Movements:      Right eye: No nystagmus.      Left eye: No nystagmus.      Conjunctiva/sclera: Conjunctivae normal.      Right eye: Right conjunctiva is not injected. No hemorrhage.     Left eye: Left conjunctiva is not injected. No hemorrhage.     Pupils: Pupils are equal, round, and reactive to light.      Funduscopic exam:     Right eye: No hemorrhage, exudate, AV nicking, arteriolar narrowing or papilledema.         Left eye: No hemorrhage, exudate, AV nicking, arteriolar narrowing or papilledema.   Cardiovascular:      Rate and Rhythm: Normal rate and regular rhythm.      Pulses: Normal pulses.      Heart sounds: Normal heart sounds, S1 normal and S2 normal. No murmur heard.  No friction rub. No gallop.    Pulmonary:      Effort: Pulmonary effort is normal. No accessory muscle usage.      Breath sounds: Normal breath sounds. No stridor. No decreased breath sounds, wheezing, rhonchi or rales.   Abdominal:      General: Bowel sounds are normal. There is no distension.      Palpations: Abdomen is soft. Abdomen is not rigid. There is no mass or pulsatile mass.      Tenderness: There is no abdominal tenderness. There is no guarding or rebound. Negative signs include Youngblood's sign.      Hernia: No hernia is present.   Feet:      Right foot:      Protective Sensation: 10 sites tested. 10 sites sensed.      Skin integrity: Skin integrity normal.      Left foot:      Protective Sensation: 10 sites tested. 10 sites sensed.      Skin integrity: Ulcer and callus present.      Comments: Reports infection that is currently being treated in left foot.  Skin:     General: Skin is warm and dry.      Coloration: Skin is not pale.   Neurological:      Mental Status: He is alert and oriented to person, place, and time.      Coordination: Coordination normal.      Gait: Gait normal.           Assessment/Plan      Medications        Problem List         LOS      Health maintenance.  Doing well, vaccines  current.  Discussed coated baby aspirin daily.  Discussed health maintenance, screening test, lifestyle modification.  EKG benign today.  Cellulitis foot.  Clinically improved today, completing course antibiotics, followed by podiatry Dr. Reyes.  Type 2 diabetes.  New onset, improved today  on glipizide XL, A1c to 7.6.    Tolerating Metformin 500 mg twice daily, add glipizide XL..    Home blood sugars levels reviewed, improving.  Follow-up 3 month.  Discussed diet, exercise, lifestyle modification.  Coated baby aspirin daily.  Treadmill echocardiogram, carotid Dopplers planned when blood sugar/hypertension is better controlled/foot infection is healed.  Hypertension.  Longstanding.     Persistent elevation today on lisinopril, recommend increase dose he declines.  Follow-up recheck.  Tobacco use.    Improved today, has cut back to 2 packs/day.  Smokes 4 packs/day since the age of 20.  Encourage cessation.  Improved today, has cut back.  Follow-up recheck.  Plan low-dose CT scan lung cancer screening next year.  Colon screening.  Recommend colonoscopy/Cologuard he declines.  Prostate cancer screening.  PSA benign.  Epigastric pain.  Likely secondary to GERD.  Benign exam today.  Mild/intermittent symptoms currently.  Discussed diet, lifestyle modification.  OTC PPI.  Consider further eval persistent symptoms        Diagnoses and all orders for this visit:    1. Type 2 diabetes mellitus with hyperglycemia, without long-term current use of insulin ([facility]) (Primary)  -     POC Glycosylated Hemoglobin (Hb A1C)  -     POCT urinalysis dipstick, manual    2. Essential hypertension    3. Class 2 severe obesity due to excess calories with serious comorbidity and body mass index (BMI) of 35.0 to 35.9 in adult ([facility])    4. Current tobacco use    5. Gastroesophageal reflux disease without esophagitis            Expected course, medications, and adverse effects discussed.  Call or return if worsening or persistent symptoms.  I wore  protective equipment throughout this patient encounter including a mask, gloves, and eye protection.  Hand hygiene was performed before donning protective equipment and after removal when leaving the room.  The complete contents of the Assessment and Plan and Data/Labs Results as documented above have been reviewed and addressed by myself with the patient today as part of an ongoing evaluation / treatment plan.  If some of the documentation has been copied from a previous note and is unchanged it indicates that this problem / plan has been assessed today but is stable from a previous visit and no changes have been recommended.

## 2022-01-31 ENCOUNTER — OFFICE VISIT (OUTPATIENT)
Dept: FAMILY MEDICINE CLINIC | Facility: CLINIC | Age: 57
End: 2022-01-31

## 2022-01-31 VITALS
HEIGHT: 75 IN | SYSTOLIC BLOOD PRESSURE: 150 MMHG | HEART RATE: 90 BPM | BODY MASS INDEX: 35.31 KG/M2 | WEIGHT: 284 LBS | DIASTOLIC BLOOD PRESSURE: 90 MMHG | RESPIRATION RATE: 18 BRPM | TEMPERATURE: 97.1 F | OXYGEN SATURATION: 99 %

## 2022-01-31 DIAGNOSIS — E66.01 CLASS 2 SEVERE OBESITY DUE TO EXCESS CALORIES WITH SERIOUS COMORBIDITY AND BODY MASS INDEX (BMI) OF 35.0 TO 35.9 IN ADULT: ICD-10-CM

## 2022-01-31 DIAGNOSIS — Z72.0 CURRENT TOBACCO USE: ICD-10-CM

## 2022-01-31 DIAGNOSIS — I10 ESSENTIAL HYPERTENSION: ICD-10-CM

## 2022-01-31 DIAGNOSIS — E11.65 TYPE 2 DIABETES MELLITUS WITH HYPERGLYCEMIA, WITHOUT LONG-TERM CURRENT USE OF INSULIN: Primary | ICD-10-CM

## 2022-01-31 DIAGNOSIS — K21.9 GASTROESOPHAGEAL REFLUX DISEASE WITHOUT ESOPHAGITIS: ICD-10-CM

## 2022-01-31 PROBLEM — R03.0 ELEVATED BLOOD PRESSURE READING: Status: RESOLVED | Noted: 2021-10-27 | Resolved: 2022-01-31

## 2022-01-31 LAB
BILIRUB BLD-MCNC: NEGATIVE MG/DL
CLARITY, POC: CLEAR
COLOR UR: YELLOW
EXPIRATION DATE: NORMAL
GLUCOSE UR STRIP-MCNC: NEGATIVE MG/DL
HBA1C MFR BLD: 7.6 %
KETONES UR QL: NEGATIVE
LEUKOCYTE EST, POC: NEGATIVE
Lab: NORMAL
NITRITE UR-MCNC: NEGATIVE MG/ML
PH UR: 6 [PH] (ref 5–8)
PROT UR STRIP-MCNC: NEGATIVE MG/DL
RBC # UR STRIP: NEGATIVE /UL
SP GR UR: 1.01 (ref 1–1.03)
UROBILINOGEN UR QL: NORMAL

## 2022-01-31 PROCEDURE — 99214 OFFICE O/P EST MOD 30 MIN: CPT | Performed by: FAMILY MEDICINE

## 2022-01-31 PROCEDURE — 81002 URINALYSIS NONAUTO W/O SCOPE: CPT | Performed by: FAMILY MEDICINE

## 2022-01-31 PROCEDURE — 83036 HEMOGLOBIN GLYCOSYLATED A1C: CPT | Performed by: FAMILY MEDICINE

## 2022-01-31 RX ORDER — DELAFLOXACIN MEGLUMINE 450 MG/1
450 TABLET ORAL 2 TIMES DAILY
COMMUNITY
Start: 2022-01-20

## 2022-03-20 DIAGNOSIS — I10 HYPERTENSION, ESSENTIAL: ICD-10-CM

## 2022-03-20 DIAGNOSIS — E11.9 NEW ONSET TYPE 2 DIABETES MELLITUS: ICD-10-CM

## 2022-03-20 DIAGNOSIS — E11.65 TYPE 2 DIABETES MELLITUS WITH HYPERGLYCEMIA, WITHOUT LONG-TERM CURRENT USE OF INSULIN: ICD-10-CM

## 2022-03-21 RX ORDER — LISINOPRIL 20 MG/1
20 TABLET ORAL DAILY
Qty: 30 TABLET | Refills: 2 | Status: SHIPPED | OUTPATIENT
Start: 2022-03-21

## 2022-04-15 DIAGNOSIS — E11.65 TYPE 2 DIABETES MELLITUS WITH HYPERGLYCEMIA, WITHOUT LONG-TERM CURRENT USE OF INSULIN: ICD-10-CM

## 2022-04-15 RX ORDER — GLIPIZIDE 5 MG/1
TABLET, FILM COATED, EXTENDED RELEASE ORAL
Qty: 30 TABLET | Refills: 4 | Status: SHIPPED | OUTPATIENT
Start: 2022-04-15 | End: 2022-09-09

## 2022-05-06 ENCOUNTER — OFFICE VISIT (OUTPATIENT)
Dept: FAMILY MEDICINE CLINIC | Facility: CLINIC | Age: 57
End: 2022-05-06

## 2022-05-06 ENCOUNTER — TELEPHONE (OUTPATIENT)
Dept: FAMILY MEDICINE CLINIC | Facility: CLINIC | Age: 57
End: 2022-05-06

## 2022-05-06 VITALS
BODY MASS INDEX: 35.09 KG/M2 | HEIGHT: 75 IN | TEMPERATURE: 97.8 F | SYSTOLIC BLOOD PRESSURE: 172 MMHG | OXYGEN SATURATION: 99 % | RESPIRATION RATE: 14 BRPM | DIASTOLIC BLOOD PRESSURE: 80 MMHG | WEIGHT: 282.2 LBS | HEART RATE: 97 BPM

## 2022-05-06 DIAGNOSIS — E11.65 TYPE 2 DIABETES MELLITUS WITH HYPERGLYCEMIA, WITHOUT LONG-TERM CURRENT USE OF INSULIN: Primary | ICD-10-CM

## 2022-05-06 DIAGNOSIS — K21.9 GASTROESOPHAGEAL REFLUX DISEASE WITHOUT ESOPHAGITIS: ICD-10-CM

## 2022-05-06 DIAGNOSIS — I10 ESSENTIAL HYPERTENSION: ICD-10-CM

## 2022-05-06 DIAGNOSIS — L03.119 CELLULITIS OF FOOT: ICD-10-CM

## 2022-05-06 DIAGNOSIS — E66.01 CLASS 2 SEVERE OBESITY DUE TO EXCESS CALORIES WITH SERIOUS COMORBIDITY AND BODY MASS INDEX (BMI) OF 35.0 TO 35.9 IN ADULT: ICD-10-CM

## 2022-05-06 DIAGNOSIS — Z72.0 CURRENT TOBACCO USE: ICD-10-CM

## 2022-05-06 LAB
BILIRUB BLD-MCNC: NEGATIVE MG/DL
CLARITY, POC: CLEAR
COLOR UR: YELLOW
EXPIRATION DATE: NORMAL
GLUCOSE UR STRIP-MCNC: NEGATIVE MG/DL
HBA1C MFR BLD: 7 %
KETONES UR QL: NEGATIVE
LEUKOCYTE EST, POC: NEGATIVE
Lab: 953
NITRITE UR-MCNC: NEGATIVE MG/ML
PH UR: 6.5 [PH] (ref 5–8)
PROT UR STRIP-MCNC: NEGATIVE MG/DL
RBC # UR STRIP: NEGATIVE /UL
SP GR UR: 1 (ref 1–1.03)
UROBILINOGEN UR QL: NORMAL

## 2022-05-06 PROCEDURE — 83036 HEMOGLOBIN GLYCOSYLATED A1C: CPT | Performed by: FAMILY MEDICINE

## 2022-05-06 PROCEDURE — 99214 OFFICE O/P EST MOD 30 MIN: CPT | Performed by: FAMILY MEDICINE

## 2022-05-06 PROCEDURE — 81002 URINALYSIS NONAUTO W/O SCOPE: CPT | Performed by: FAMILY MEDICINE

## 2022-05-06 NOTE — ASSESSMENT & PLAN NOTE
Class 2 Severe Obesity (BMI >=35 and <=39.9). Obesity-related health conditions include the following: hypertension and diabetes mellitus. Obesity is unchanged. BMI is is above average; BMI management plan is completed. We discussed portion control and increasing exercise.

## 2022-05-06 NOTE — PROGRESS NOTES
Subjective   Silverio Angeles is a 56 y.o. male.     Chief Complaint   Patient presents with   • Diabetes   • Hypertension       Diabetic Foot Exam Performed and Monofilament Test Performed    Diabetes  He presents for his follow-up diabetic visit. He has type 2 diabetes mellitus. His disease course has been stable. Pertinent negatives for hypoglycemia include no pallor or seizures. (Vertigo and tinnitus ) Pertinent negatives for diabetes include no blurred vision, no chest pain, no fatigue, no polydipsia and no polyphagia. There are no hypoglycemic complications. Risk factors for coronary artery disease include male sex, obesity and hypertension. Current diabetic treatment includes oral agent (monotherapy). His weight is stable. He never participates in exercise. There is no change in his home blood glucose trend. His breakfast blood glucose is taken between 5-6 am. His breakfast blood glucose range is generally 130-140 mg/dl. His lunch blood glucose is taken between 2-3 pm. His lunch blood glucose range is generally 130-140 mg/dl. His dinner blood glucose range is generally 140-180 mg/dl. His overall blood glucose range is 140-180 mg/dl. (Glucose was 160 this morning prior to today's visit.  ) He sees a podiatrist.Eye exam is current ([facility] 5 months ago).   Hypertension  This is a chronic problem. The current episode started more than 1 year ago. Pertinent negatives include no blurred vision, chest pain, palpitations or shortness of breath. (Vertigo and ringing in ears) Risk factors for coronary artery disease include obesity, male gender, diabetes mellitus and smoking/tobacco exposure. Current antihypertension treatment includes ACE inhibitors.            I personally reviewed and updated the patient's allergies, medications, problem list, and past medical, surgical, social, and family history. I have reviewed and confirmed the accuracy of the History of Present Illness and Review of Symptoms as  documented by the MA/LPNAVI/RN. Cuco Garcia MD    Family History   Problem Relation Age of Onset   • Cancer Mother    • Diabetes Father    • Diabetes Sister    • Cancer Sister        Social History     Tobacco Use   • Smoking status: Current Every Day Smoker     Packs/day: 4.00     Years: 40.00     Pack years: 160.00     Types: Cigarettes     Start date: 1981   • Smokeless tobacco: Never Used   Vaping Use   • Vaping Use: Never used   Substance Use Topics   • Alcohol use: Yes     Alcohol/week: 0.0 standard drinks     Comment: rarely   • Drug use: Not Currently       Past Surgical History:   Procedure Laterality Date   • HAND SURGERY Left 1981       Patient Active Problem List   Diagnosis   • Foot injury, left, sequela   • Encounter for tobacco use cessation counseling   • Current tobacco use   • Skin lesion   • Annual visit for general adult medical examination with abnormal findings   • Type 2 diabetes mellitus with hyperglycemia, without long-term current use of insulin (formerly [facility])   • Class 2 severe obesity due to excess calories with serious comorbidity and body mass index (BMI) of 35.0 to 35.9 in adult (HCC)   • Essential hypertension   • Gastroesophageal reflux disease without esophagitis   • Cellulitis of foot         Current Outpatient Medications:   •  Baxdela 450 MG tablet, Take 450 mg by mouth 2 (Two) Times a Day., Disp: , Rfl:   •  glipizide (GLUCOTROL XL) 5 MG ER tablet, TAKE 1 TABLET BY MOUTH EVERY DAY, Disp: 30 tablet, Rfl: 4  •  Glucose Blood (Blood Glucose Test) strip, 1 strip by In Vitro route 3 (Three) Times a Day., Disp: 90 each, Rfl: 5  •  lisinopril (PRINIVIL,ZESTRIL) 20 MG tablet, Take 1 tablet by mouth Daily., Disp: 30 tablet, Rfl: 2  •  metFORMIN (GLUCOPHAGE) 500 MG tablet, Take 1 tablet by mouth 2 (Two) Times a Day With Meals., Disp: 60 tablet, Rfl: 2  •  buPROPion XL (WELLBUTRIN XL) 150 MG 24 hr tablet, Take 1 tablet by mouth Daily., Disp: 30 tablet, Rfl: 2          Review of Systems  "  Constitutional: Negative for chills, diaphoresis and fatigue.   Eyes: Negative for blurred vision and visual disturbance.   Respiratory: Negative for shortness of breath.    Cardiovascular: Negative for chest pain and palpitations.   Gastrointestinal: Negative for abdominal pain and nausea.   Endocrine: Negative for polydipsia and polyphagia.   Musculoskeletal: Negative for neck stiffness.   Skin: Negative for color change and pallor.   Neurological: Negative for seizures and syncope.   Hematological: Negative for adenopathy.   Psychiatric/Behavioral: Negative for hallucinations and suicidal ideas.       I have reviewed and confirmed the accuracy of the ROS as documented by the MA/LPN/RN Cuco Garcia MD      Objective   /80 (BP Location: Left arm, Patient Position: Standing, Cuff Size: Large Adult)   Pulse 97   Temp 97.8 °F (36.6 °C)   Resp 14   Ht 190.5 cm (75\")   Wt 128 kg (282 lb 3.2 oz)   SpO2 99%   BMI 35.27 kg/m²   BP Readings from Last 3 Encounters:   05/06/22 172/80   01/31/22 150/90   10/27/21 132/84     Wt Readings from Last 3 Encounters:   05/06/22 128 kg (282 lb 3.2 oz)   01/31/22 129 kg (284 lb)   10/27/21 129 kg (283 lb 12.8 oz)           Data / Lab Results:    Hemoglobin A1C   Date Value Ref Range Status   05/06/2022 7.0 % Final   01/31/2022 7.6 % Final   10/27/2021 10.0 % Final        Lab Results   Component Value Date    LDL 59 09/20/2021     No results found for: CHOL  Lab Results   Component Value Date    TRIG 124 09/20/2021     Lab Results   Component Value Date    HDL 43 09/20/2021     Lab Results   Component Value Date    PSA 2.0 09/20/2021     Lab Results   Component Value Date    WBC 8.7 09/20/2021    HGB 17.2 09/20/2021    HCT 50.9 09/20/2021    MCV 93 09/20/2021     09/20/2021     Lab Results   Component Value Date    TSH 1.380 09/20/2021      Lab Results   Component Value Date    GLUCOSE CANCELED 09/20/2021    BUN 13 09/20/2021    CREATININE 0.75 (L) 09/20/2021    " EGFRIFNONA 104 09/20/2021    EGFRIFAFRI 120 09/20/2021    BCR 17 09/20/2021    K CANCELED 09/20/2021    CO2 24 09/20/2021    CALCIUM 9.7 09/20/2021    PROTENTOTREF 7.2 09/20/2021    ALBUMIN 4.5 09/20/2021    LABIL2 1.7 09/20/2021    AST 16 09/20/2021    ALT 24 09/20/2021     No results found for: ADRIANA, RF, SEDRATE   No results found for: CRP   No results found for: IRON, TIBC, FERRITIN   No results found for: [id number]         Physical Exam  Constitutional:       Appearance: Normal appearance. He is well-developed. He is not diaphoretic.   HENT:      Head: Normocephalic.      Right Ear: Tympanic membrane, ear canal and external ear normal.      Left Ear: Tympanic membrane, ear canal and external ear normal.      Nose: Nose normal.   Eyes:      General: Lids are normal.      Extraocular Movements:      Right eye: No nystagmus.      Left eye: No nystagmus.      Conjunctiva/sclera: Conjunctivae normal.      Right eye: Right conjunctiva is not injected. No hemorrhage.     Left eye: Left conjunctiva is not injected. No hemorrhage.     Pupils: Pupils are equal, round, and reactive to light.      Funduscopic exam:     Right eye: No hemorrhage, exudate, AV nicking, arteriolar narrowing or papilledema.         Left eye: No hemorrhage, exudate, AV nicking, arteriolar narrowing or papilledema.   Neck:      Thyroid: No thyromegaly.      Vascular: No carotid bruit or JVD.      Trachea: No tracheal deviation.   Cardiovascular:      Rate and Rhythm: Normal rate and regular rhythm.      Pulses: Normal pulses.      Heart sounds: Normal heart sounds, S1 normal and S2 normal. No murmur heard.    No friction rub. No gallop.   Pulmonary:      Effort: Pulmonary effort is normal. No accessory muscle usage.      Breath sounds: Normal breath sounds. No stridor. No decreased breath sounds, wheezing, rhonchi or rales.   Abdominal:      General: Bowel sounds are normal. There is no distension.      Palpations: Abdomen is soft. Abdomen is not  rigid. There is no mass or pulsatile mass.      Tenderness: There is no abdominal tenderness. There is no guarding or rebound. Negative signs include Youngblood's sign.      Hernia: No hernia is present.   Lymphadenopathy:      Head:      Right side of head: No submental, submandibular, tonsillar, preauricular, posterior auricular or occipital adenopathy.      Left side of head: No submental, submandibular, tonsillar, preauricular, posterior auricular or occipital adenopathy.      Cervical: No cervical adenopathy.   Skin:     General: Skin is warm and dry.      Coloration: Skin is not pale.   Neurological:      Mental Status: He is alert and oriented to person, place, and time.      Cranial Nerves: No cranial nerve deficit.      Sensory: No sensory deficit.      Coordination: Coordination normal.      Gait: Gait normal.      Deep Tendon Reflexes: Reflexes are normal and symmetric.           Assessment/Plan      Medications        Problem List         LOS      Health maintenance.  Doing well, vaccines current.  Discussed coated baby aspirin daily.  Discussed health maintenance, screening test, lifestyle modification.  EKG benign today.  Cellulitis foot.  Clinically improved today, completing course antibiotics, followed by podiatry Dr. Reyes.  Has had benign ABIs per his report.  Type 2 diabetes.  New onset, improved today  on glipizide XL, A1c to 7.0.    Tolerating Metformin 500 mg twice daily, glipizide XL..    Home blood sugars levels reviewed, improving.  Follow-up 3 month.  Discussed diet, exercise, lifestyle modification.  Coated baby aspirin daily.  Treadmill echocardiogram, carotid Dopplers planned when blood sugar/hypertension is better controlled/foot infection is healed.  Hypertension.  Longstanding.     Persistent elevation today on lisinopril, recommend increase dose/add second medication he declines but states he will monitor his blood pressure at home.  Follow-up recheck.  Tobacco use.    Improved today,  has cut back to 2 packs/day.  Smokes 4 packs/day since the age of 20.  Encourage cessation.  Improved today, has cut back.  Follow-up recheck.  Plan low-dose CT scan lung cancer screening next year.  Colon screening.  Recommend colonoscopy/Cologuard he declines.  Prostate cancer screening.  PSA benign.  Epigastric pain.  Likely secondary to GERD.  Benign exam today.  Mild/intermittent symptoms currently.  Discussed diet, lifestyle modification.  OTC PPI.  Consider further eval persistent symptoms  Tinnitus.  Likely secondary to barotrauma.  Trial of hold aspirin by 3 to 4 weeks, restart if no improvement.  Consider HEENT referral/hearing testing if worsening symptoms.  Left knee pain.  Longstanding.  Orthopedics referral scheduled.      Diagnoses and all orders for this visit:    1. Type 2 diabetes mellitus with hyperglycemia, without long-term current use of insulin ([facility]) (Primary)  -     POCT urinalysis dipstick, manual  -     POC Glycosylated Hemoglobin (Hb A1C)    2. Essential hypertension    3. Class 2 severe obesity due to excess calories with serious comorbidity and body mass index (BMI) of 35.0 to 35.9 in adult ([facility])  Assessment & Plan:  Class 2 Severe Obesity (BMI >=35 and <=39.9). Obesity-related health conditions include the following: hypertension and diabetes mellitus. Obesity is unchanged. BMI is is above average; BMI management plan is completed. We discussed portion control and increasing exercise.        4. Current tobacco use    5. Gastroesophageal reflux disease without esophagitis    6. Cellulitis of foot          Expected course, medications, and adverse effects discussed.  Call or return if worsening or persistent symptoms.  I wore protective equipment throughout this patient encounter including a mask, gloves, and eye protection.  Hand hygiene was performed before donning protective equipment and after removal when leaving the room.  The complete contents of the Assessment and Plan and  Data/Labs Results as documented above have been reviewed and addressed by myself with the patient today as part of an ongoing evaluation / treatment plan.  If some of the documentation has been copied from a previous note and is unchanged it indicates that this problem / plan has been assessed today but is stable from a previous visit and no changes have been recommended.

## 2022-05-13 DIAGNOSIS — M25.562 LEFT KNEE PAIN, UNSPECIFIED CHRONICITY: Primary | ICD-10-CM

## 2022-08-30 PROBLEM — B95.62 CELLULITIS DUE TO MRSA: Status: ACTIVE | Noted: 2022-08-30

## 2022-08-30 PROBLEM — L03.90 CELLULITIS DUE TO MRSA: Status: ACTIVE | Noted: 2022-08-30

## 2022-08-30 RX ORDER — DEXTROSE MONOHYDRATE 50 MG/ML
100 INJECTION, SOLUTION INTRAVENOUS ONCE
Status: CANCELLED | OUTPATIENT
Start: 2022-09-02

## 2022-09-02 ENCOUNTER — HOSPITAL ENCOUNTER (OUTPATIENT)
Dept: INFUSION THERAPY | Facility: HOSPITAL | Age: 57
Discharge: HOME | End: 2022-09-02
Admitting: PODIATRIST

## 2022-09-02 VITALS
DIASTOLIC BLOOD PRESSURE: 91 MMHG | HEART RATE: 80 BPM | TEMPERATURE: 98.2 F | RESPIRATION RATE: 16 BRPM | SYSTOLIC BLOOD PRESSURE: 132 MMHG | OXYGEN SATURATION: 99 %

## 2022-09-02 DIAGNOSIS — B95.62 CELLULITIS DUE TO MRSA: Primary | ICD-10-CM

## 2022-09-02 DIAGNOSIS — L03.90 CELLULITIS DUE TO MRSA: Primary | ICD-10-CM

## 2022-09-02 PROCEDURE — 36415 COLL VENOUS BLD VENIPUNCTURE: CPT

## 2022-09-02 PROCEDURE — 25010000002 DALBAVANCIN 500 MG RECONSTITUTED SOLUTION 1 EACH VIAL: Performed by: PODIATRIST

## 2022-09-02 PROCEDURE — 96365 THER/PROPH/DIAG IV INF INIT: CPT

## 2022-09-02 RX ORDER — DEXTROSE MONOHYDRATE 50 MG/ML
100 INJECTION, SOLUTION INTRAVENOUS ONCE
Status: CANCELLED | OUTPATIENT
Start: 2022-09-02

## 2022-09-02 RX ORDER — DEXTROSE MONOHYDRATE 50 MG/ML
100 INJECTION, SOLUTION INTRAVENOUS ONCE
Status: DISCONTINUED | OUTPATIENT
Start: 2022-09-02 | End: 2022-09-04 | Stop reason: HOSPADM

## 2022-09-02 RX ADMIN — DEXTROSE MONOHYDRATE 1500 MG: 50 INJECTION, SOLUTION INTRAVENOUS at 11:25

## 2022-09-09 DIAGNOSIS — E11.65 TYPE 2 DIABETES MELLITUS WITH HYPERGLYCEMIA, WITHOUT LONG-TERM CURRENT USE OF INSULIN: ICD-10-CM

## 2022-09-09 RX ORDER — GLIPIZIDE 5 MG/1
TABLET, FILM COATED, EXTENDED RELEASE ORAL
Qty: 30 TABLET | Refills: 0 | Status: SHIPPED | OUTPATIENT
Start: 2022-09-09

## 2022-10-09 DIAGNOSIS — E11.65 TYPE 2 DIABETES MELLITUS WITH HYPERGLYCEMIA, WITHOUT LONG-TERM CURRENT USE OF INSULIN: ICD-10-CM

## 2022-10-10 RX ORDER — BLOOD SUGAR DIAGNOSTIC
STRIP MISCELLANEOUS
Qty: 100 EACH | Refills: 5 | Status: SHIPPED | OUTPATIENT
Start: 2022-10-10

## 2023-08-14 DIAGNOSIS — E11.65 TYPE 2 DIABETES MELLITUS WITH HYPERGLYCEMIA, WITHOUT LONG-TERM CURRENT USE OF INSULIN: ICD-10-CM

## 2023-08-14 RX ORDER — BLOOD SUGAR DIAGNOSTIC
STRIP MISCELLANEOUS
Qty: 100 EACH | Refills: 0 | Status: SHIPPED | OUTPATIENT
Start: 2023-08-14

## 2023-10-01 DIAGNOSIS — E11.65 TYPE 2 DIABETES MELLITUS WITH HYPERGLYCEMIA, WITHOUT LONG-TERM CURRENT USE OF INSULIN: ICD-10-CM

## 2023-10-02 RX ORDER — BLOOD SUGAR DIAGNOSTIC
STRIP MISCELLANEOUS
Qty: 200 EACH | Refills: 3 | Status: SHIPPED | OUTPATIENT
Start: 2023-10-02

## 2023-10-17 ENCOUNTER — TRANSCRIBE ORDERS (OUTPATIENT)
Dept: ADMINISTRATIVE | Facility: HOSPITAL | Age: 58
End: 2023-10-17
Payer: COMMERCIAL

## 2023-10-17 DIAGNOSIS — Z13.6 SCREENING FOR HEART DISEASE: Primary | ICD-10-CM

## 2023-11-24 ENCOUNTER — HOSPITAL ENCOUNTER (OUTPATIENT)
Dept: CT IMAGING | Facility: HOSPITAL | Age: 58
Discharge: HOME | End: 2023-11-24
Admitting: STUDENT IN AN ORGANIZED HEALTH CARE EDUCATION/TRAINING PROGRAM

## 2023-11-24 DIAGNOSIS — Z13.6 SCREENING FOR HEART DISEASE: ICD-10-CM

## 2023-11-24 PROCEDURE — 75571 CT HRT W/O DYE W/CA TEST: CPT

## 2023-12-28 ENCOUNTER — TRANSCRIBE ORDERS (OUTPATIENT)
Dept: ADMINISTRATIVE | Facility: HOSPITAL | Age: 58
End: 2023-12-28
Payer: COMMERCIAL

## 2023-12-28 DIAGNOSIS — R94.31 ABNORMAL ELECTROCARDIOGRAM: ICD-10-CM

## 2023-12-28 DIAGNOSIS — R93.1 HIGH CORONARY ARTERY CALCIUM SCORE: ICD-10-CM

## 2023-12-28 DIAGNOSIS — R06.09 DYSPNEA ON EXERTION: Primary | ICD-10-CM

## 2024-01-12 ENCOUNTER — HOSPITAL ENCOUNTER (OUTPATIENT)
Dept: NUCLEAR MEDICINE | Facility: HOSPITAL | Age: 59
Discharge: HOME | End: 2024-01-12
Payer: COMMERCIAL

## 2024-01-12 DIAGNOSIS — R06.09 DYSPNEA ON EXERTION: ICD-10-CM

## 2024-01-12 DIAGNOSIS — R94.31 ABNORMAL ELECTROCARDIOGRAM: ICD-10-CM

## 2024-01-12 DIAGNOSIS — R93.1 HIGH CORONARY ARTERY CALCIUM SCORE: ICD-10-CM

## 2024-01-12 LAB
BH CV REST NUCLEAR ISOTOPE DOSE: 11 MCI
BH CV STRESS BP STAGE 1: NORMAL
BH CV STRESS BP STAGE 2: NORMAL
BH CV STRESS COMMENTS STAGE 1: NORMAL
BH CV STRESS COMMENTS STAGE 2: NORMAL
BH CV STRESS DOSE REGADENOSON STAGE 1: 0.4
BH CV STRESS DURATION MIN STAGE 1: 0
BH CV STRESS DURATION MIN STAGE 2: 4
BH CV STRESS DURATION SEC STAGE 1: 10
BH CV STRESS DURATION SEC STAGE 2: 0
BH CV STRESS HR STAGE 1: 96
BH CV STRESS HR STAGE 2: 85
BH CV STRESS NUCLEAR ISOTOPE DOSE: 33 MCI
BH CV STRESS PROTOCOL 1: NORMAL
BH CV STRESS RECOVERY BP: NORMAL MMHG
BH CV STRESS RECOVERY HR: 92 BPM
BH CV STRESS STAGE 1: 1
BH CV STRESS STAGE 2: 2
LV EF NUC BP: 45 %
MAXIMAL PREDICTED HEART RATE: 164 BPM
PERCENT MAX PREDICTED HR: 58.54 %
STRESS BASELINE BP: NORMAL MMHG
STRESS BASELINE HR: 81 BPM
STRESS PERCENT HR: 69 %
STRESS POST PEAK BP: NORMAL MMHG
STRESS POST PEAK HR: 96 BPM
STRESS TARGET HR: 139 BPM

## 2024-01-12 PROCEDURE — 25010000002 REGADENOSON 0.4 MG/5ML SOLUTION: Performed by: STUDENT IN AN ORGANIZED HEALTH CARE EDUCATION/TRAINING PROGRAM

## 2024-01-12 PROCEDURE — 93017 CV STRESS TEST TRACING ONLY: CPT

## 2024-01-12 PROCEDURE — A9502 TC99M TETROFOSMIN: HCPCS | Performed by: STUDENT IN AN ORGANIZED HEALTH CARE EDUCATION/TRAINING PROGRAM

## 2024-01-12 PROCEDURE — 0 TECHNETIUM TETROFOSMIN KIT: Performed by: STUDENT IN AN ORGANIZED HEALTH CARE EDUCATION/TRAINING PROGRAM

## 2024-01-12 PROCEDURE — 78452 HT MUSCLE IMAGE SPECT MULT: CPT

## 2024-01-12 RX ORDER — REGADENOSON 0.08 MG/ML
0.4 INJECTION, SOLUTION INTRAVENOUS
Status: COMPLETED | OUTPATIENT
Start: 2024-01-12 | End: 2024-01-12

## 2024-01-12 RX ADMIN — TETROFOSMIN 1 DOSE: 1.38 INJECTION, POWDER, LYOPHILIZED, FOR SOLUTION INTRAVENOUS at 08:55

## 2024-01-12 RX ADMIN — REGADENOSON 0.4 MG: 0.08 INJECTION, SOLUTION INTRAVENOUS at 11:03

## 2024-01-12 RX ADMIN — TETROFOSMIN 1 DOSE: 1.38 INJECTION, POWDER, LYOPHILIZED, FOR SOLUTION INTRAVENOUS at 11:02
